# Patient Record
Sex: MALE | NOT HISPANIC OR LATINO | Employment: FULL TIME | URBAN - METROPOLITAN AREA
[De-identification: names, ages, dates, MRNs, and addresses within clinical notes are randomized per-mention and may not be internally consistent; named-entity substitution may affect disease eponyms.]

---

## 2024-06-04 ASSESSMENT — ENCOUNTER SYMPTOMS
CARDIOVASCULAR NEGATIVE: 1
MYALGIAS: 1
ARTHRALGIAS: 1
RESPIRATORY NEGATIVE: 1
CONSTITUTIONAL NEGATIVE: 1

## 2024-06-04 NOTE — PROGRESS NOTES
"New patient  History Of Present Illness  Yovanny Bowers is a 63 y.o. male presenting with RIGHT knee pain. Pt states that he has been experiencing pain for the past two months. He believes that when he kicked the covers off in the middle of the night, he heard a \"crack\". Pain the following day that never went away. Notes that he has had his LEFT knee \"done\" back in either 2008 or 2012. Pain located on the anterior and medial aspect of the knee, patellar tendon, medial aspect of the distal aspect of the lower thigh and upper medial aspect of the lower leg. Pain with walking, stairs, flexion. Notes that since he sits at a computer most of the day that his knee will get stiff if he sits too long. Notes that his knee \"cracks\" all the time. He has tried stretching his knee, ice, heat, Ibuprofen with minimal to no relief. Denies any numbness or tingling. Occasionally slight swelling medially will present but none currently at today's visit. Rates current pain as a 5/10 describing it as a constant aching burning pain and states that pain can get to a 9/10 at worst. No previous history of injury.  We discussed treatment options.  Upon exam patient has indications of a patellar tendinitis as well as an MCL sprain and a quad strain.  After discussion we will start patient into physical therapy and I recommended an over-the-counter brace with a metal hinge for increased ability.  We can follow-up in 4 to 6 weeks for reevaluation and if patient continues to have pain or disability we can consider further treatment options such as CT or MRI as indicated.  Patient verbalizes understanding and agreement with plan of care.    Past Medical History  He has a past medical history of History of back surgery and History of left knee replacement.    Surgical History  He has a past surgical history that includes Back surgery and Total knee arthroplasty (Left).     Social History  He reports that he has quit smoking. His smoking use " "included cigarettes. He has never used smokeless tobacco. He reports current alcohol use. He reports that he does not use drugs.    Family History  Family History   Problem Relation Name Age of Onset    Diabetes Mother      Hypertension Mother      Hypertension Father          Allergies  Metformin    Review of Systems  Review of Systems   Constitutional: Negative.    Respiratory: Negative.     Cardiovascular: Negative.    Musculoskeletal:  Positive for arthralgias and myalgias.   All other systems reviewed and are negative.    Last Recorded Vitals  /88 (BP Location: Right arm, Patient Position: Sitting, BP Cuff Size: Large adult)   Pulse 88   Ht 1.702 m (5' 7\")   Wt 90.7 kg (200 lb)   BMI 31.32 kg/m²      Examination:  RIGHT    Edema: Negative.   Effusion: Negative.   Percussion Test: Negative.   Tuning Fork Test:  Negative.   Ecchymosis/Bruising: Negative.            Palpation:   Positive: tender to palpation over anterior aspect of knee, medial joint line, patellar tendon, distal medial aspect of quadricep muscle    Orientation: Negative symmetrical.     Range of Motion:    Positive Knee Flexion ( 0-135 degrees) Decreased because of pain  Positive Knee Extension ( 0-15 degrees) Decreased because of pain           Muscle Strength:    Positive +4/+5 Quadricep Extension Causes pain  Positive +4/+5 Hamstring Flexion Causes pain  +5+5 Gastrocnemius  +5+5 Soleus.            Proprioception:   Pain with Squat: Positive   Pain with Sumo Squat:  Positive    Hop Test: Positive    Single leg balance test: Positive            Vascular:   +2/+4 Femoral  +2/+4 Dorsalis Pedis  +2/+4 Posterior Tibial  Capillary Refill less than 2 seconds.                Knee - ACL:  Anterior Drawer Test: Negative.   Lachman Test: Negative.   Lelli Test: Negative.               KNEE - MCL / LCL:  Valgus Stress Test: 90 degrees: Positive   20-30 degrees: Positive   Varus Stress Test:  90 degrees: Negative, 20-30 degrees: Negative.   Apley " Distraction Test: Negative.   Thessaly Test: Negative.           KNEE - MENISCUS:  Apley Compression Test:  Negative.    Stienmann Test:  Negative.   Dennis Test:  Negative.    Bounce Home Test:  Negative.   Thessaly Test:  Negative.             KNEE - PATELLA:  Apprehension Test:  Negative.   Glide Test: Positive   Grind Test: Positive   Patella Tracking Test: Negative.              KNEE - QUADRICEPS:         VMO Test: Positive   VLO Test:  Negative    Hip/Pelvis - Sacrum:  Leg Length Supine: Negative.   Leg Length Supine to Seated (Derbolowsky Sign): Negative.   Standing Flexion Test: Negative.   Seated Flexion Test: Negative.   Spring Test: Negative.   Sacral Somatic Dysfunction: Negative.   Hip Flexor Tightness: Negative.   Hamstring Tightness: Negative.         Feet/Foot: Positive BILATERAL Valgus foot     Imaging and Diagnostics Review:  XRAYs ordered during today's visit  X-rays independently reviewed no acute pathology noted other than mild suprapatellar effusion as well as moderate degenerative joint disease.  Assessment   1. Acute pain of right knee    2. Sprain of right knee, initial encounter    3. Patellar tendinitis of right knee    4. Patellar maltracking, right        Plan   Treatment or Intervention:  May use PRICE therapy as needed.  Start into Physical Therapy 1-2 times a week for 8-10 weeks with manual therapy as well as dry needling and IASTM  Stressed the importance of wearing shoes with good stability control to help with the biomechanics affecting the lower legs  Stressed the importance of wearing full foot insoles to help with the biomechanics affecting the lower legs  Recommendation over-the-counter calcium 500mg, 3 times a day with vitamin-D3 9585-6864+ units a day with food as well as a daily multivitamin  Recommendation over-the-counter Move Free for joint health  May take OTC Tylenol Extra Strength or OTC Tylenol Arthritis taking one every 6-8 hours with food as needed for pain  management.  Patient advised regarding the risk and/or potential adverse reactions and/or side effects of any prescribed medications along with any over-the-counter medications or any supplements used. Patient advised to seek immediate medical care if any adverse reactions occur. The patient and/or patient(s) parent(s) verbalized their understanding.  Discussed in detail with the patient to the level of their understanding the possibility in the future of regenerative injections versus corticosteroids injections  Possibility in future of MRI of RIGHT KNEE to rule out tendon vs ligament vs tear vs fracture vs other, MSK to read.    Diagnostic studies:      Activity Instructions, Restrictions, and Accommodations:      Consultations/Referrals:      Follow-up:  Follow up 6-8 weeks  Total appointment time _30_ minutes. Greater than 50% spent counseling patient on results of physical exam, treatment options as well as reasons for ordered imaging and anticipated treatment for possible results, need for PT and expected outcomes, as well as discussing possible medications.     SARAH TAPIA on 6/5/24 at 10:24 AM.     Nelson Petersen CNP

## 2024-06-04 NOTE — PATIENT INSTRUCTIONS
May use PRICE therapy as needed.  Start into Physical Therapy 1-2 times a week for 8-10 weeks with manual therapy as well as dry needling and IASTM  Stressed the importance of wearing shoes with good stability control to help with the biomechanics affecting the lower legs  Stressed the importance of wearing full foot insoles to help with the biomechanics affecting the lower legs  Recommendation over-the-counter calcium 500mg, 3 times a day with vitamin-D3 2243-8472+ units a day with food as well as a daily multivitamin  Recommendation over-the-counter Move Free for joint health  May take OTC Tylenol Extra Strength or OTC Tylenol Arthritis taking one every 6-8 hours with food as needed for pain management.  Patient advised regarding the risk and/or potential adverse reactions and/or side effects of any prescribed medications along with any over-the-counter medications or any supplements used. Patient advised to seek immediate medical care if any adverse reactions occur. The patient and/or patient(s) parent(s) verbalized their understanding.  Discussed in detail with the patient to the level of their understanding the possibility in the future of regenerative injections versus corticosteroids injections  Possibility in future of MRI of RIGHT KNEE to rule out tendon vs ligament vs tear vs fracture vs other, MSK to read.  Follow up 6-8 weeks (OFFICE NUMBER [phone])

## 2024-06-05 ENCOUNTER — HOSPITAL ENCOUNTER (OUTPATIENT)
Dept: RADIOLOGY | Facility: CLINIC | Age: 64
Discharge: HOME | End: 2024-06-05
Payer: COMMERCIAL

## 2024-06-05 ENCOUNTER — OFFICE VISIT (OUTPATIENT)
Dept: SPORTS MEDICINE | Facility: CLINIC | Age: 64
End: 2024-06-05
Payer: COMMERCIAL

## 2024-06-05 VITALS
BODY MASS INDEX: 31.39 KG/M2 | DIASTOLIC BLOOD PRESSURE: 88 MMHG | HEART RATE: 88 BPM | SYSTOLIC BLOOD PRESSURE: 128 MMHG | WEIGHT: 200 LBS | HEIGHT: 67 IN

## 2024-06-05 DIAGNOSIS — S83.91XA SPRAIN OF RIGHT KNEE, INITIAL ENCOUNTER: ICD-10-CM

## 2024-06-05 DIAGNOSIS — S76.111A QUADRICEPS STRAIN, RIGHT, INITIAL ENCOUNTER: ICD-10-CM

## 2024-06-05 DIAGNOSIS — S83.411A SPRAIN OF MEDIAL COLLATERAL LIGAMENT OF RIGHT KNEE, INITIAL ENCOUNTER: ICD-10-CM

## 2024-06-05 DIAGNOSIS — M21.079 VALGUS FOOT DEFORMITY, ACQUIRED, UNSPECIFIED LATERALITY: ICD-10-CM

## 2024-06-05 DIAGNOSIS — M76.51 PATELLAR TENDINITIS OF RIGHT KNEE: ICD-10-CM

## 2024-06-05 DIAGNOSIS — M25.561 ACUTE PAIN OF RIGHT KNEE: ICD-10-CM

## 2024-06-05 PROCEDURE — 99203 OFFICE O/P NEW LOW 30 MIN: CPT | Performed by: NURSE PRACTITIONER

## 2024-06-05 PROCEDURE — 1159F MED LIST DOCD IN RCRD: CPT | Performed by: NURSE PRACTITIONER

## 2024-06-05 PROCEDURE — 73564 X-RAY EXAM KNEE 4 OR MORE: CPT | Mod: RIGHT SIDE | Performed by: RADIOLOGY

## 2024-06-05 PROCEDURE — 1125F AMNT PAIN NOTED PAIN PRSNT: CPT | Performed by: NURSE PRACTITIONER

## 2024-06-05 PROCEDURE — 73564 X-RAY EXAM KNEE 4 OR MORE: CPT | Mod: RT

## 2024-06-05 PROCEDURE — 99213 OFFICE O/P EST LOW 20 MIN: CPT | Performed by: NURSE PRACTITIONER

## 2024-06-05 RX ORDER — TRAZODONE HYDROCHLORIDE 50 MG/1
50 TABLET ORAL NIGHTLY
COMMUNITY
Start: 2024-03-21

## 2024-06-05 RX ORDER — VALSARTAN 80 MG/1
80 TABLET ORAL DAILY
COMMUNITY
Start: 2024-03-16

## 2024-06-05 RX ORDER — TIRZEPATIDE 15 MG/.5ML
15 INJECTION, SOLUTION SUBCUTANEOUS
COMMUNITY
Start: 2024-05-03

## 2024-06-05 RX ORDER — MELOXICAM 7.5 MG/1
7.5 TABLET ORAL DAILY
COMMUNITY
Start: 2024-05-26 | End: 2024-06-10

## 2024-06-05 RX ORDER — NEOMYCIN SULFATE, POLYMYXIN B SULFATE AND DEXAMETHASONE 3.5; 10000; 1 MG/ML; [USP'U]/ML; MG/ML
1 SUSPENSION/ DROPS OPHTHALMIC DAILY
COMMUNITY
Start: 2024-05-08

## 2024-06-05 ASSESSMENT — PATIENT HEALTH QUESTIONNAIRE - PHQ9
SUM OF ALL RESPONSES TO PHQ9 QUESTIONS 1 AND 2: 0
2. FEELING DOWN, DEPRESSED OR HOPELESS: NOT AT ALL
1. LITTLE INTEREST OR PLEASURE IN DOING THINGS: NOT AT ALL

## 2024-06-05 ASSESSMENT — ENCOUNTER SYMPTOMS
DEPRESSION: 0
LOSS OF SENSATION IN FEET: 0
OCCASIONAL FEELINGS OF UNSTEADINESS: 0

## 2024-06-05 ASSESSMENT — PAIN SCALES - GENERAL: PAINLEVEL: 5
